# Patient Record
Sex: MALE | Race: WHITE | NOT HISPANIC OR LATINO | Employment: UNEMPLOYED | URBAN - METROPOLITAN AREA
[De-identification: names, ages, dates, MRNs, and addresses within clinical notes are randomized per-mention and may not be internally consistent; named-entity substitution may affect disease eponyms.]

---

## 2023-04-28 ENCOUNTER — HOSPITAL ENCOUNTER (EMERGENCY)
Facility: HOSPITAL | Age: 48
Discharge: HOME | End: 2023-04-28
Attending: EMERGENCY MEDICINE
Payer: MEDICAID

## 2023-04-28 VITALS
TEMPERATURE: 98 F | BODY MASS INDEX: 26.58 KG/M2 | HEIGHT: 63 IN | OXYGEN SATURATION: 98 % | SYSTOLIC BLOOD PRESSURE: 169 MMHG | HEART RATE: 67 BPM | DIASTOLIC BLOOD PRESSURE: 99 MMHG | WEIGHT: 150 LBS | RESPIRATION RATE: 17 BRPM

## 2023-04-28 DIAGNOSIS — L72.3 INFECTED SEBACEOUS CYST OF SKIN: Primary | ICD-10-CM

## 2023-04-28 DIAGNOSIS — L08.9 INFECTED SEBACEOUS CYST OF SKIN: Primary | ICD-10-CM

## 2023-04-28 PROCEDURE — 99283 EMERGENCY DEPT VISIT LOW MDM: CPT

## 2023-04-28 RX ORDER — SULFAMETHOXAZOLE AND TRIMETHOPRIM 800; 160 MG/1; MG/1
1 TABLET ORAL 2 TIMES DAILY
Qty: 14 TABLET | Refills: 0 | Status: SHIPPED | OUTPATIENT
Start: 2023-04-28 | End: 2023-04-28 | Stop reason: SDUPTHER

## 2023-04-28 RX ORDER — SULFAMETHOXAZOLE AND TRIMETHOPRIM 800; 160 MG/1; MG/1
1 TABLET ORAL 2 TIMES DAILY
Qty: 14 TABLET | Refills: 0 | Status: SHIPPED | OUTPATIENT
Start: 2023-04-28 | End: 2023-05-05

## 2023-04-28 NOTE — DISCHARGE INSTRUCTIONS
Hot compresses 5 minutes to 10 minutes at a time 5 or 6 times a day    Take the antibiotics until they are all gone    Please make arrangements with primary care doctor general surgeon type physician to re-evaluate this    It may not resolve even if it comes to a head and ruptured it will most likely reoccur    Return for any emergency problem    Tylenol is okay for any pain

## 2023-04-28 NOTE — ED PROVIDER NOTES
Encounter Date: 2023       History     Chief Complaint   Patient presents with    Insect Bite     C/o insect bite to back for 4 weeks     Mr. Gilbert says that for a month maybe a little longer he is had a area in his back that was swollen now is turning red and painful he says he did not go see about it is just moved back from [place] and does not have a regular doctor.    He has a history of an opiate addiction now is on Subutex since  he has a history of hypertension he takes medication for that.    Smokes cigarettes he does not do alcohol he no longer does any opiates.      He is had COVID shots x2 no pneumonia no flu no tetanus.  Surgical history none he states.      Says he is waiting on his disability.  He is  lives with his mom current lead mother is alive with diabetes dad is  he also had diabetes.  Patient denies systemic symptoms such as fever chills nausea and vomiting    Patient tells me he is very scared of needles and he does not want this cut open when I discussed with him the appropriate treatment for it.  He would rather try antibiotics and hot compresses    Review of patient's allergies indicates:  No Known Allergies  History reviewed. No pertinent past medical history.  History reviewed. No pertinent surgical history.  History reviewed. No pertinent family history.  Social History     Tobacco Use    Smoking status: Every Day     Types: Cigarettes    Smokeless tobacco: Never     Review of Systems   Skin:         Painful bump center of back progressive for 4 weeks now red and tender   All other systems reviewed and are negative.    Physical Exam     Initial Vitals [23 0743]   BP Pulse Resp Temp SpO2   (!) 169/99 67 17 97.6 °F (36.4 °C) 98 %      MAP       --         Physical Exam    Nursing note and vitals reviewed.  Constitutional: He appears well-developed and well-nourished.   HENT:   Head: Normocephalic and atraumatic.   Eyes: Pupils are equal, round, and reactive  to light.     Neurological: He is alert and oriented to person, place, and time. GCS score is 15. GCS eye subscore is 4. GCS verbal subscore is 5. GCS motor subscore is 6.   Skin: Skin is warm and dry. Capillary refill takes less than 2 seconds.   In almost center portion of his back.  Both superior inferior and left to right there is a classic infected sebaceous cyst it is indurated a cm to a cm and a half it is a proximally 3 cm in diameter it is mildly tender to touch there is a punctate center to it.  There is no purulent drainage from it.  There is no surrounding gross cellulitis.     Psychiatric: He has a normal mood and affect. His behavior is normal. Thought content normal.       ED Course   Procedures  Labs Reviewed - No data to display       Imaging Results    None          Medications - No data to display  Medical Decision Making:   Initial Assessment:   Says insect bite but never had anything bite him he says that a part of his back that he can not get to.  He seen it in a mirror.  On examination it is a classic infected sebaceous cyst in the center aspect of his back.  Differential Diagnosis:   Infected sebaceous cyst, infected insect bite, abscess cutaneous.  ED Management:  Patient declines the preferred method of treatment incision and drainage.  He wants to try antibiotics and hot compresses says he is too afraid of needles.  I will not obviously compel the patient to have anything done he does not want to do I will place him on Bactrim as well as hot compresses and he will be discharged  MDM  Problems addressed  Co-morbidities and/or factors adding to the complexity or risk for the patient:  None known  Problems addressed:  Infected sebaceous cyst  Acute problem/illness or progression/exacerbation of chronic problem with potential threat to life/bodily dysfunction?:  None  Differential diagnoses/problems considered: see above     Amount and/or Complexity of Data Reviewed  Independent Historian:  none (see above for summary)  External Data Reviewed: no prior records available for review  (see above for summary)  Risk and benefits of testing: discussed   Labs: Labs: ordered and reviewed  Radiology:Radiology: ordered and independent interpretation performed (see above or ED course)  ECG/medicine tests:Radiology: ordered and independent interpretation performed (see above or ED course)  none    Risk  Prescription drug management   Shared decision making     Critical Care  none            ED Course as of 04/29/23 0629   Fri Apr 28, 2023   0807 I told the patient the preferred treatment for this is incision and drainage.  He declines that he does not want it open he said he is too afraid of needles.  I did explain to the patient that we can use antibiotics that he can use hot compresses but does generally do not work very well for these thick wall infected sebaceous cyst.  Nevertheless he said that is what he wants to do.  I advised the patient he needs to find a doctor to follow up with.  And see about having it excised by a local physician.  If the antibiotics and hot compresses do not bring it to ahead it ruptures.  Also explained to him.  These things tender reoccur.  Because the cyst is a natural part of his body. [DM]      ED Course User Index  [DM] Christian Eckert MD                 Clinical Impression:   Final diagnoses:  [L72.3, L08.9] Infected sebaceous cyst of skin (Primary)        ED Disposition Condition    Discharge Stable          ED Prescriptions       Medication Sig Dispense Start Date End Date Auth. Provider    sulfamethoxazole-trimethoprim 800-160mg (BACTRIM DS) 800-160 mg Tab  (Status: Discontinued) Take 1 tablet by mouth 2 (two) times daily. for 7 days 14 tablet 4/28/2023 4/28/2023 Christian Eckert MD    sulfamethoxazole-trimethoprim 800-160mg (BACTRIM DS) 800-160 mg Tab  (Status: Discontinued) Take 1 tablet by mouth 2 (two) times daily. for 7 days 14 tablet 4/28/2023 4/28/2023 Christian RAYMOND  MD Tomeka    sulfamethoxazole-trimethoprim 800-160mg (BACTRIM DS) 800-160 mg Tab Take 1 tablet by mouth 2 (two) times daily. for 7 days 14 tablet 4/28/2023 5/5/2023 Christian Eckert MD          Follow-up Information    None          Christian Eckert MD  04/29/23 5872

## 2023-11-10 ENCOUNTER — HOSPITAL ENCOUNTER (EMERGENCY)
Facility: HOSPITAL | Age: 48
Discharge: HOME | End: 2023-11-11
Attending: INTERNAL MEDICINE
Payer: MEDICAID

## 2023-11-10 DIAGNOSIS — R10.9 RIGHT FLANK PAIN: Primary | ICD-10-CM

## 2023-11-10 DIAGNOSIS — N20.1 URETEROLITHIASIS: ICD-10-CM

## 2023-11-10 DIAGNOSIS — N13.2 HYDRONEPHROSIS WITH URINARY OBSTRUCTION DUE TO URETERAL CALCULUS: ICD-10-CM

## 2023-11-10 PROCEDURE — 80307 DRUG TEST PRSMV CHEM ANLYZR: CPT | Performed by: INTERNAL MEDICINE

## 2023-11-10 PROCEDURE — 99285 EMERGENCY DEPT VISIT HI MDM: CPT | Mod: 25

## 2023-11-10 PROCEDURE — 81001 URINALYSIS AUTO W/SCOPE: CPT | Performed by: INTERNAL MEDICINE

## 2023-11-11 VITALS
BODY MASS INDEX: 27.71 KG/M2 | SYSTOLIC BLOOD PRESSURE: 142 MMHG | HEART RATE: 74 BPM | WEIGHT: 156.38 LBS | RESPIRATION RATE: 18 BRPM | TEMPERATURE: 98 F | OXYGEN SATURATION: 99 % | DIASTOLIC BLOOD PRESSURE: 76 MMHG

## 2023-11-11 LAB
ALBUMIN SERPL-MCNC: 3.8 G/DL (ref 3.5–5)
ALBUMIN/GLOB SERPL: 1.4 RATIO (ref 1.1–2)
ALP SERPL-CCNC: 68 UNIT/L (ref 40–150)
ALT SERPL-CCNC: 28 UNIT/L (ref 0–55)
AMPHET UR QL SCN: POSITIVE
APPEARANCE UR: CLEAR
AST SERPL-CCNC: 24 UNIT/L (ref 5–34)
BACTERIA #/AREA URNS AUTO: ABNORMAL /HPF
BARBITURATE SCN PRESENT UR: NEGATIVE
BASOPHILS # BLD AUTO: 0.07 X10(3)/MCL
BASOPHILS NFR BLD AUTO: 0.9 %
BENZODIAZ UR QL SCN: NEGATIVE
BILIRUB SERPL-MCNC: 0.3 MG/DL
BILIRUB UR QL STRIP.AUTO: NEGATIVE
BUN SERPL-MCNC: 14 MG/DL (ref 8.9–20.6)
CALCIUM SERPL-MCNC: 9 MG/DL (ref 8.4–10.2)
CANNABINOIDS UR QL SCN: NEGATIVE
CHLORIDE SERPL-SCNC: 104 MMOL/L (ref 98–107)
CO2 SERPL-SCNC: 28 MMOL/L (ref 22–29)
COCAINE UR QL SCN: NEGATIVE
COLOR UR AUTO: YELLOW
CREAT SERPL-MCNC: 0.86 MG/DL (ref 0.73–1.18)
EOSINOPHIL # BLD AUTO: 0.29 X10(3)/MCL (ref 0–0.9)
EOSINOPHIL NFR BLD AUTO: 3.7 %
ERYTHROCYTE [DISTWIDTH] IN BLOOD BY AUTOMATED COUNT: 11.7 % (ref 11.5–17)
FENTANYL UR QL SCN: NEGATIVE
GFR SERPLBLD CREATININE-BSD FMLA CKD-EPI: >60 MLS/MIN/1.73/M2
GLOBULIN SER-MCNC: 2.8 GM/DL (ref 2.4–3.5)
GLUCOSE SERPL-MCNC: 104 MG/DL (ref 74–100)
GLUCOSE UR QL STRIP.AUTO: NEGATIVE
HCT VFR BLD AUTO: 44.2 % (ref 42–52)
HGB BLD-MCNC: 15 G/DL (ref 14–18)
IMM GRANULOCYTES # BLD AUTO: 0.02 X10(3)/MCL (ref 0–0.04)
IMM GRANULOCYTES NFR BLD AUTO: 0.3 %
KETONES UR QL STRIP.AUTO: NEGATIVE
LEUKOCYTE ESTERASE UR QL STRIP.AUTO: ABNORMAL
LYMPHOCYTES # BLD AUTO: 1.51 X10(3)/MCL (ref 0.6–4.6)
LYMPHOCYTES NFR BLD AUTO: 19.3 %
MCH RBC QN AUTO: 30.9 PG (ref 27–31)
MCHC RBC AUTO-ENTMCNC: 33.9 G/DL (ref 33–36)
MCV RBC AUTO: 91.1 FL (ref 80–94)
MDMA UR QL SCN: NEGATIVE
MONOCYTES # BLD AUTO: 0.57 X10(3)/MCL (ref 0.1–1.3)
MONOCYTES NFR BLD AUTO: 7.3 %
NEUTROPHILS # BLD AUTO: 5.38 X10(3)/MCL (ref 2.1–9.2)
NEUTROPHILS NFR BLD AUTO: 68.5 %
NITRITE UR QL STRIP.AUTO: NEGATIVE
OPIATES UR QL SCN: NEGATIVE
PCP UR QL: NEGATIVE
PH UR STRIP.AUTO: 6.5 [PH]
PH UR: 6.5 [PH] (ref 3–11)
PLATELET # BLD AUTO: 288 X10(3)/MCL (ref 130–400)
PMV BLD AUTO: 10 FL (ref 7.4–10.4)
POTASSIUM SERPL-SCNC: 4.1 MMOL/L (ref 3.5–5.1)
PROT SERPL-MCNC: 6.6 GM/DL (ref 6.4–8.3)
PROT UR QL STRIP.AUTO: ABNORMAL
RBC # BLD AUTO: 4.85 X10(6)/MCL (ref 4.7–6.1)
RBC #/AREA URNS AUTO: ABNORMAL /HPF
RBC UR QL AUTO: ABNORMAL
SODIUM SERPL-SCNC: 141 MMOL/L (ref 136–145)
SP GR UR STRIP.AUTO: 1.01 (ref 1–1.03)
SQUAMOUS #/AREA URNS AUTO: ABNORMAL /HPF
UROBILINOGEN UR STRIP-ACNC: 0.2
WBC # SPEC AUTO: 7.84 X10(3)/MCL (ref 4.5–11.5)
WBC #/AREA URNS AUTO: ABNORMAL /HPF

## 2023-11-11 PROCEDURE — 85025 COMPLETE CBC W/AUTO DIFF WBC: CPT | Performed by: INTERNAL MEDICINE

## 2023-11-11 PROCEDURE — 63600175 PHARM REV CODE 636 W HCPCS: Performed by: INTERNAL MEDICINE

## 2023-11-11 PROCEDURE — 25000003 PHARM REV CODE 250: Performed by: INTERNAL MEDICINE

## 2023-11-11 PROCEDURE — 96375 TX/PRO/DX INJ NEW DRUG ADDON: CPT

## 2023-11-11 PROCEDURE — 80053 COMPREHEN METABOLIC PANEL: CPT | Performed by: INTERNAL MEDICINE

## 2023-11-11 PROCEDURE — 96374 THER/PROPH/DIAG INJ IV PUSH: CPT

## 2023-11-11 RX ORDER — ONDANSETRON 2 MG/ML
4 INJECTION INTRAMUSCULAR; INTRAVENOUS
Status: COMPLETED | OUTPATIENT
Start: 2023-11-11 | End: 2023-11-11

## 2023-11-11 RX ORDER — KETOROLAC TROMETHAMINE 10 MG/1
10 TABLET, FILM COATED ORAL EVERY 6 HOURS
Qty: 20 TABLET | Refills: 0 | Status: SHIPPED | OUTPATIENT
Start: 2023-11-11 | End: 2023-11-16

## 2023-11-11 RX ORDER — ONDANSETRON 4 MG/1
4 TABLET, ORALLY DISINTEGRATING ORAL EVERY 6 HOURS PRN
Qty: 20 TABLET | Refills: 0 | Status: SHIPPED | OUTPATIENT
Start: 2023-11-11 | End: 2023-11-16

## 2023-11-11 RX ORDER — KETOROLAC TROMETHAMINE 30 MG/ML
30 INJECTION, SOLUTION INTRAMUSCULAR; INTRAVENOUS
Status: COMPLETED | OUTPATIENT
Start: 2023-11-11 | End: 2023-11-11

## 2023-11-11 RX ORDER — PROPRANOLOL HYDROCHLORIDE 60 MG/1
60 CAPSULE, EXTENDED RELEASE ORAL DAILY
Qty: 30 CAPSULE | Refills: 0 | Status: SHIPPED | OUTPATIENT
Start: 2023-11-11 | End: 2023-12-11

## 2023-11-11 RX ORDER — TAMSULOSIN HYDROCHLORIDE 0.4 MG/1
0.4 CAPSULE ORAL DAILY
Qty: 7 CAPSULE | Refills: 0 | Status: SHIPPED | OUTPATIENT
Start: 2023-11-11 | End: 2023-11-18

## 2023-11-11 RX ADMIN — SODIUM CHLORIDE 1000 ML: 9 INJECTION, SOLUTION INTRAVENOUS at 12:11

## 2023-11-11 RX ADMIN — KETOROLAC TROMETHAMINE 30 MG: 30 INJECTION, SOLUTION INTRAMUSCULAR; INTRAVENOUS at 02:11

## 2023-11-11 RX ADMIN — ONDANSETRON 4 MG: 2 INJECTION INTRAMUSCULAR; INTRAVENOUS at 02:11

## 2023-11-11 NOTE — ED PROVIDER NOTES
Encounter Date: 11/10/2023       History     Chief Complaint   Patient presents with    Flank Pain     Right flank pain x2 weeks increasing tonight, also c/o dysuria.      48-year-old white male reports intermittent flank pain for the past couple of weeks and trouble urinating that got much worse this afternoon and continued to progress so he presented to the emergency department.  He denies ever having a history of kidney stones.  He denies fever chills but he does admit to some nausea when the pain gets intense.  He does report he has a history of abuse of drugs but he is currently on Subutex and clean and does not want any narcotic drugs.  He is very pleasant and respectful and appears to be alert gentleman lately and some pain      Review of patient's allergies indicates:  No Known Allergies  Past Medical History:   Diagnosis Date    Hypertension      History reviewed. No pertinent surgical history.  History reviewed. No pertinent family history.  Social History     Tobacco Use    Smoking status: Every Day     Types: Cigarettes    Smokeless tobacco: Never   Substance Use Topics    Alcohol use: Never    Drug use: Yes     Types: Methamphetamines     Comment: suboxone     Review of Systems   Constitutional: Negative.  Negative for activity change, appetite change, chills, diaphoresis, fatigue, fever and unexpected weight change.   HENT: Negative.  Negative for congestion, dental problem, drooling, ear discharge, ear pain, facial swelling, hearing loss, mouth sores, nosebleeds, postnasal drip, rhinorrhea, sinus pressure, sinus pain, sneezing, sore throat, tinnitus, trouble swallowing and voice change.    Eyes: Negative.  Negative for photophobia, pain, discharge, redness, itching and visual disturbance.   Respiratory: Negative.  Negative for apnea, cough, choking, chest tightness, shortness of breath, wheezing and stridor.    Cardiovascular: Negative.  Negative for chest pain, palpitations and leg swelling.    Gastrointestinal: Negative.  Negative for abdominal distention, abdominal pain, anal bleeding, blood in stool, constipation, diarrhea, nausea, rectal pain and vomiting.   Endocrine: Negative.  Negative for cold intolerance, heat intolerance, polydipsia, polyphagia and polyuria.   Genitourinary:  Positive for flank pain and urgency. Negative for decreased urine volume, difficulty urinating, dysuria, enuresis, frequency, genital sores, hematuria, penile discharge, penile pain, penile swelling, scrotal swelling and testicular pain.   Musculoskeletal:  Negative for arthralgias, back pain, gait problem, joint swelling, myalgias, neck pain and neck stiffness.   Skin: Negative.  Negative for color change, pallor, rash and wound.   Allergic/Immunologic: Negative.  Negative for environmental allergies, food allergies and immunocompromised state.   Neurological: Negative.  Negative for dizziness, tremors, seizures, syncope, facial asymmetry, speech difficulty, weakness, light-headedness, numbness and headaches.   Hematological: Negative.  Negative for adenopathy. Does not bruise/bleed easily.   Psychiatric/Behavioral: Negative.  Negative for agitation, behavioral problems, confusion, decreased concentration, dysphoric mood, hallucinations, self-injury, sleep disturbance and suicidal ideas. The patient is not nervous/anxious and is not hyperactive.    All other systems reviewed and are negative.      Physical Exam     Initial Vitals [11/10/23 2342]   BP Pulse Resp Temp SpO2   (!) 186/103 90 18 98.4 °F (36.9 °C) 99 %      MAP       --         Physical Exam    Nursing note and vitals reviewed.  Constitutional: He appears well-developed and well-nourished.   HENT:   Head: Normocephalic and atraumatic.   Eyes: Conjunctivae and EOM are normal. Pupils are equal, round, and reactive to light.   Neck: Neck supple.   Normal range of motion.  Cardiovascular:  Normal rate and regular rhythm.           Pulmonary/Chest: Breath sounds  normal.   Abdominal: Abdomen is soft. Bowel sounds are normal.   Musculoskeletal:         General: Normal range of motion.        Arms:       Cervical back: Normal range of motion and neck supple.     Neurological: He is alert and oriented to person, place, and time.   Skin: Skin is warm and dry. Capillary refill takes less than 2 seconds.   Psychiatric: He has a normal mood and affect. His behavior is normal. Judgment and thought content normal.         ED Course   Procedures  Admission on 11/10/2023   Component Date Value Ref Range Status    Sodium Level 11/11/2023 141  136 - 145 mmol/L Final    Potassium Level 11/11/2023 4.1  3.5 - 5.1 mmol/L Final    Chloride 11/11/2023 104  98 - 107 mmol/L Final    Carbon Dioxide 11/11/2023 28  22 - 29 mmol/L Final    Glucose Level 11/11/2023 104 (H)  74 - 100 mg/dL Final    Blood Urea Nitrogen 11/11/2023 14.0  8.9 - 20.6 mg/dL Final    Creatinine 11/11/2023 0.86  0.73 - 1.18 mg/dL Final    Calcium Level Total 11/11/2023 9.0  8.4 - 10.2 mg/dL Final    Protein Total 11/11/2023 6.6  6.4 - 8.3 gm/dL Final    Albumin Level 11/11/2023 3.8  3.5 - 5.0 g/dL Final    Globulin 11/11/2023 2.8  2.4 - 3.5 gm/dL Final    Albumin/Globulin Ratio 11/11/2023 1.4  1.1 - 2.0 ratio Final    Bilirubin Total 11/11/2023 0.3  <=1.5 mg/dL Final    Alkaline Phosphatase 11/11/2023 68  40 - 150 unit/L Final    Alanine Aminotransferase 11/11/2023 28  0 - 55 unit/L Final    Aspartate Aminotransferase 11/11/2023 24  5 - 34 unit/L Final    eGFR 11/11/2023 >60  mls/min/1.73/m2 Final    Color, UA 11/10/2023 Yellow  Yellow, Light-Yellow, Dark Yellow, Gilda, Straw Final    Appearance, UA 11/10/2023 Clear  Clear Final    Specific Gravity, UA 11/10/2023 1.010  1.005 - 1.030 Final    pH, UA 11/10/2023 6.5  5.0 - 8.5 Final    Protein, UA 11/10/2023 1+ (A)  Negative Final    Glucose, UA 11/10/2023 Negative  Negative, Normal Final    Ketones, UA 11/10/2023 Negative  Negative Final    Blood, UA 11/10/2023 3+ (A)   Negative Final    Bilirubin, UA 11/10/2023 Negative  Negative Final    Urobilinogen, UA 11/10/2023 0.2  0.2, 1.0, Normal Final    Nitrites, UA 11/10/2023 Negative  Negative Final    Leukocyte Esterase, UA 11/10/2023 Trace (A)  Negative Final    Amphetamines, Urine 11/10/2023 Positive (A)  Negative Final    Barbituates, Urine 11/10/2023 Negative  Negative Final    Benzodiazepine, Urine 11/10/2023 Negative  Negative Final    Cannabinoids, Urine 11/10/2023 Negative  Negative Final    Cocaine, Urine 11/10/2023 Negative  Negative Final    Fentanyl, Urine 11/10/2023 Negative  Negative Final    MDMA, Urine 11/10/2023 Negative  Negative Final    Opiates, Urine 11/10/2023 Negative  Negative Final    Phencyclidine, Urine 11/10/2023 Negative  Negative Final    pH, Urine 11/10/2023 6.5  3.0 - 11.0 Final    WBC 11/11/2023 7.84  4.50 - 11.50 x10(3)/mcL Final    RBC 11/11/2023 4.85  4.70 - 6.10 x10(6)/mcL Final    Hgb 11/11/2023 15.0  14.0 - 18.0 g/dL Final    Hct 11/11/2023 44.2  42.0 - 52.0 % Final    MCV 11/11/2023 91.1  80.0 - 94.0 fL Final    MCH 11/11/2023 30.9  27.0 - 31.0 pg Final    MCHC 11/11/2023 33.9  33.0 - 36.0 g/dL Final    RDW 11/11/2023 11.7  11.5 - 17.0 % Final    Platelet 11/11/2023 288  130 - 400 x10(3)/mcL Final    MPV 11/11/2023 10.0  7.4 - 10.4 fL Final    Neut % 11/11/2023 68.5  % Final    Lymph % 11/11/2023 19.3  % Final    Mono % 11/11/2023 7.3  % Final    Eos % 11/11/2023 3.7  % Final    Basophil % 11/11/2023 0.9  % Final    Lymph # 11/11/2023 1.51  0.6 - 4.6 x10(3)/mcL Final    Neut # 11/11/2023 5.38  2.1 - 9.2 x10(3)/mcL Final    Mono # 11/11/2023 0.57  0.1 - 1.3 x10(3)/mcL Final    Eos # 11/11/2023 0.29  0 - 0.9 x10(3)/mcL Final    Baso # 11/11/2023 0.07  <=0.2 x10(3)/mcL Final    IG# 11/11/2023 0.02  0 - 0.04 x10(3)/mcL Final    IG% 11/11/2023 0.3  % Final    Bacteria, UA 11/10/2023 None Seen  None Seen, Rare, Occasional /HPF Final    RBC, UA 11/10/2023 3-5  None Seen, 0-2, 3-5, 0-5 /HPF Final     WBC, UA 11/10/2023 3-5  None Seen, 0-2, 3-5, 0-5 /HPF Final    Squamous Epithelial Cells, UA 11/10/2023 Few (A)  None Seen, Rare, Occasional, Occ /HPF Final       Labs Reviewed   COMPREHENSIVE METABOLIC PANEL - Abnormal; Notable for the following components:       Result Value    Glucose Level 104 (*)     All other components within normal limits   URINALYSIS, REFLEX TO URINE CULTURE - Abnormal; Notable for the following components:    Protein, UA 1+ (*)     Blood, UA 3+ (*)     Leukocyte Esterase, UA Trace (*)     All other components within normal limits   DRUG SCREEN, URINE (BEAKER) - Abnormal; Notable for the following components:    Amphetamines, Urine Positive (*)     All other components within normal limits    Narrative:     Cut off concentrations:    Amphetamines - 1000 ng/ml  Barbiturates - 200 ng/ml  Benzodiazepine - 200 ng/ml  Cannabinoids (THC) - 50 ng/ml  Cocaine - 300 ng/ml  Fentanyl - 1.0 ng/ml  MDMA - 500 ng/ml  Opiates - 300 ng/ml   Phencyclidine (PCP) - 25 ng/ml    Specimen submitted for drug analysis and tested for pH and specific gravity in order to evaluate sample integrity. Suspect tampering if specific gravity is <1.003 and/or pH is not within the range of 4.5 - 8.0  False negatives may result form substances such as bleach added to urine.  False positives may result for the presence of a substance with similar chemical structure to the drug or its metabolite.    This test provides only a PRELIMINARY analytical test result. A more specific alternate chemical method must be used in order to obtain a confirmed analytical result. Gas chromatography/mass spectrometry (GC/MS) is the preferred confirmatory method. Other chemical confirmation methods are available. Clinical consideration and professional judgement should be applied to any drug of abuse test result, particularly when preliminary positive results are used.    Positive results will be confirmed only at the physicians request.  Unconfirmed screening results are to be used only for medical purposes (treatment).        URINALYSIS, MICROSCOPIC - Abnormal; Notable for the following components:    Squamous Epithelial Cells, UA Few (*)     All other components within normal limits   CBC W/ AUTO DIFFERENTIAL    Narrative:     The following orders were created for panel order CBC auto differential.  Procedure                               Abnormality         Status                     ---------                               -----------         ------                     CBC with Differential[[id number]]                            Final result                 Please view results for these tests on the individual orders.   CBC WITH DIFFERENTIAL          Imaging Results              CT Renal Stone Study ABD Pelvis WO (Preliminary result)  Result time 11/11/23 01:35:17      Preliminary result by Oral Agarwal MD (11/11/23 01:35:17)                   Narrative:    START OF REPORT:  Technique: CT of the abdomen and pelvis was performed with axial images as well as sagittal and coronal reconstruction images without intravenous contrast.    Comparison: None available.    Clinical History: Right flank pain.    Dosage Information: Automated Exposure Control was utilized 189.42 mGy.cm.    Findings:  Lines and Tubes: None.  Thorax:  Lungs: There is minimal nonspecific dependent change at the lung bases. No focal infiltrate or consolidation is seen.  Pleura: No effusions or thickening. No pneumothorax is seen.  Heart: The heart size is within normal limits.  Abdomen:  Abdominal Wall: No abdominal wall pathology is seen.  Liver: The liver appears unremarkable.  Biliary System: No intrahepatic or extrahepatic biliary duct dilatation is seen.  Gallbladder: The gallbladder appears unremarkable.  Pancreas: The pancreas appears unremarkable.  Spleen: The spleen appears unremarkable.  Adrenals: The adrenal glands appear unremarkable.  Kidneys: The left kidney  appears unremarkable with no stones cysts masses or hydronephrosis. There is moderate right hydroureteronephrosis due to a 4 mmright ureterovesical junctioncalculus seen on image 119 series 2. The right kidney appears otherwise unremarkable with no stones, mass, cysts.  Bowel:  Esophagus: The visualized esophagus appears unremarkable.  Stomach: The stomach appears unremarkable.  Duodenum: Unremarkable appearing duodenum.  Small Bowel: The small bowel appears unremarkable.  Colon: Nondistended.  Appendix: The appendix appears unremarkable seen on Image 66, Series 2.  Peritoneum: No intraperitoneal free air or ascites is seen.    Pelvis:  Bladder: The bladder appears unremarkable.  Male:  Prostate gland: There is some calcification seen within the prostate gland.    Bony structures:  Dorsal Spine: The visualized dorsal spine appears unremarkable.  Bony Pelvis: The visualized bony structures of the pelvis appear unremarkable.      Impression:  1. There is moderate right hydroureteronephrosis due to a 4 mmright ureterovesical junctioncalculus seen on image 119 series 2.  2. Details and other findings as discussed above.                                         Medications   ketorolac injection 30 mg (has no administration in time range)   ondansetron injection 4 mg (has no administration in time range)   sodium chloride 0.9% bolus 1,000 mL 1,000 mL (1,000 mLs Intravenous New Bag 11/11/23 0024)     Medical Decision Making  48-year-old white male presents with flank pain and dysuria.  Workup was started which included blood work and urinalysis.  An IV was started and he was given a bolus of IV fluids.  His urinalysis did show some blood so he was sent through the CT scanner for a stone protocol CT of the abdomen and pelvis.  His creatinine is normal and when I discussed options he states that he does not want narcotics because he is on Subutex already.  CT was done and shows a 4 mm stone in the right ureterovesical  junction causing some right-sided hydronephrosis.  I discussed these findings with him and will send him home on Toradol Zofran and Flomax.  We discussed importance of him following up with his primary clinic Monday morning to get referral to Urology but he was safe to be discharged secondary to his creatinine being normal patient voiced understanding    Problems Addressed:  Hydronephrosis with urinary obstruction due to ureteral calculus: acute illness or injury  Right flank pain: acute illness or injury  Ureterolithiasis: acute illness or injury    Amount and/or Complexity of Data Reviewed  Labs: ordered. Decision-making details documented in ED Course.  Radiology: ordered and independent interpretation performed. Decision-making details documented in ED Course.    Risk  OTC drugs.  Prescription drug management.                               Clinical Impression:   Final diagnoses:  [R10.9] Right flank pain (Primary)  [N20.1] Ureterolithiasis  [N13.2] Hydronephrosis with urinary obstruction due to ureteral calculus        ED Disposition Condition    Discharge Stable          ED Prescriptions       Medication Sig Dispense Start Date End Date Auth. Provider    ketorolac (TORADOL) 10 mg tablet Take 1 tablet (10 mg total) by mouth every 6 (six) hours. for 5 days 20 tablet 11/11/2023 11/16/2023 Carlos Rubalcava MD    ondansetron (ZOFRAN-ODT) 4 MG TbDL Take 1 tablet (4 mg total) by mouth every 6 (six) hours as needed. 20 tablet 11/11/2023 11/16/2023 Carlos Rubalcava MD    tamsulosin (FLOMAX) 0.4 mg Cap Take 1 capsule (0.4 mg total) by mouth once daily. for 7 days 7 capsule 11/11/2023 11/18/2023 Carlos Rubalcava MD          Follow-up Information       Follow up With Specialties Details Why Contact Info    Primary care physician  In 2 days               Carlos Rubalcava MD  11/11/23 [id number]